# Patient Record
Sex: MALE | Race: WHITE | NOT HISPANIC OR LATINO | Employment: FULL TIME | URBAN - METROPOLITAN AREA
[De-identification: names, ages, dates, MRNs, and addresses within clinical notes are randomized per-mention and may not be internally consistent; named-entity substitution may affect disease eponyms.]

---

## 2020-05-06 ENCOUNTER — OCCUPATIONAL HEALTH (OUTPATIENT)
Dept: URGENT CARE | Facility: CLINIC | Age: 60
End: 2020-05-06

## 2020-05-06 DIAGNOSIS — U07.1 COVID-19: Primary | ICD-10-CM

## 2020-05-06 PROCEDURE — U0002 COVID-19 LAB TEST NON-CDC: HCPCS

## 2020-05-07 ENCOUNTER — TELEPHONE (OUTPATIENT)
Dept: URGENT CARE | Facility: CLINIC | Age: 60
End: 2020-05-07

## 2020-05-07 LAB — SARS-COV-2 RNA RESP QL NAA+PROBE: NOT DETECTED

## 2020-05-08 NOTE — TELEPHONE ENCOUNTER
Attempted to call patient with negative coronavirus test results.  There was no answer.  Left message for patient to return my call.

## 2020-05-08 NOTE — TELEPHONE ENCOUNTER
Spoke with patient.  Informed him of negative coronavirus test results.  Patient denies any further complaints or concerns at this time.    Your test was NEGATIVE for COVID-19 (coronavirus).  If you still have symptoms, treat with rest, fluids, and over-the-counter medications.  Continue to stay home and practice proper handwashing.     If your symptoms worsen or if you have any other concerns, please contact [facility] at [phone].     Sincerely,    Peg Calderon PA-C

## 2020-10-19 ENCOUNTER — TELEPHONE (OUTPATIENT)
Dept: HEPATOLOGY | Facility: CLINIC | Age: 60
End: 2020-10-19

## 2020-10-19 DIAGNOSIS — R76.8 HEPATITIS C ANTIBODY TEST POSITIVE: Primary | ICD-10-CM

## 2020-10-19 NOTE — TELEPHONE ENCOUNTER
Pt being referred to HCV clinic by [facility]. Attempt made to reach pt unsuccessful. Voicemail was left asking that pt contacts us for scheduling. Letter also mailed asking that pt contacts us for scheduling.

## 2020-10-19 NOTE — TELEPHONE ENCOUNTER
----- Message from Yuridia Retana MA sent at 10/16/2020  4:26 PM CDT -----  Regarding: FW: Appt request    ----- Message -----  From: Becky Rivera  Sent: 10/16/2020   3:24 PM CDT  To: [facility] Hepatitis C Staff  Subject: Appt request                                     Good afternoon, I received the following patients referral/ medical records and scanned them into the  tab. I labeled the documents under, Hepatology referral. The pt DX is Hep C. Please let me know if there is anything further I can do.     Thank you,     [facility]